# Patient Record
Sex: MALE | Race: BLACK OR AFRICAN AMERICAN
[De-identification: names, ages, dates, MRNs, and addresses within clinical notes are randomized per-mention and may not be internally consistent; named-entity substitution may affect disease eponyms.]

---

## 2017-02-25 ENCOUNTER — HOSPITAL ENCOUNTER (EMERGENCY)
Dept: HOSPITAL 58 - ED | Age: 37
Discharge: HOME | End: 2017-02-25

## 2017-02-25 VITALS — DIASTOLIC BLOOD PRESSURE: 82 MMHG | SYSTOLIC BLOOD PRESSURE: 125 MMHG

## 2017-02-25 VITALS — TEMPERATURE: 99.5 F

## 2017-02-25 VITALS — BODY MASS INDEX: 27.1 KG/M2

## 2017-02-25 DIAGNOSIS — J01.10: Primary | ICD-10-CM

## 2017-02-25 DIAGNOSIS — R42: ICD-10-CM

## 2017-02-25 LAB
ALBUMIN SERPL-MCNC: 4.6 G/DL (ref 3.4–5)
ALBUMIN/GLOB SERPL: 1.24 {RATIO}
ALP SERPL-CCNC: 98 U/L (ref 50–136)
ALT SERPL-CCNC: 17 U/L (ref 12–78)
ANION GAP SERPL CALC-SCNC: 15.1 MMOL/L
AST SERPL-CCNC: 26 U/L (ref 15–37)
BASOPHILS # BLD AUTO: 0 K/UL (ref 0–0.2)
BASOPHILS NFR BLD AUTO: 0.8 % (ref 0–3)
BILIRUB SERPL-MCNC: 0.76 MG/DL (ref 0–1.2)
BUN SERPL-MCNC: 12 MG/DL (ref 7–18)
BUN/CREAT SERPL: 8.39
CALCIUM SERPL-MCNC: 9.9 MG/DL (ref 8.2–10.2)
CHLORIDE SERPL-SCNC: 101 MMOL/L (ref 98–107)
CK MB SERPL-MCNC: 1.8 NG/ML (ref 0–3.6)
CK SERPL-CCNC: 752 U/L
CO2 BLD-SCNC: 27 MMOL/L (ref 21–32)
CREAT SERPL-MCNC: 1.43 MG/DL (ref 0.6–1.1)
EOSINOPHIL # BLD AUTO: 0.3 K/UL (ref 0–0.7)
EOSINOPHIL NFR BLD AUTO: 5.5 % (ref 0–7)
GFR SERPLBLD BASED ON 1.73 SQ M-ARVRAT: 67 ML/MIN
GLOBULIN SER CALC-MCNC: 3.7 G/L
GLUCOSE SERPL-MCNC: 117 MG/DL (ref 70–100)
HCT VFR BLD AUTO: 45.9 % (ref 42–52)
HGB BLD-MCNC: 15.5 G/DL (ref 14–18)
IMM GRANULOCYTES NFR BLD AUTO: 0.4 % (ref 0–5)
LYMPHOCYTES # SPEC AUTO: 0.5 K/UL (ref 0.6–3.4)
LYMPHOCYTES NFR BLD AUTO: 9.6 % (ref 10–50)
MCH RBC QN: 27.2 PG (ref 27–31)
MCHC RBC AUTO-ENTMCNC: 33.8 G/DL (ref 31.8–35.4)
MCV RBC: 80.5 FL (ref 80–94)
MONOCYTES # BLD AUTO: 0.4 K/UL (ref 0.4–2)
MONOCYTES NFR BLD AUTO: 7.2 % (ref 0–10)
NEUTROPHILS # BLD AUTO: 4.1 K/UL (ref 2–6.9)
NEUTROPHILS NFR BLD AUTO: 76.5 %
PLATELET # BLD AUTO: 243 10^3/UL (ref 140–440)
POTASSIUM SERPL-SCNC: 4.1 MMOL/L (ref 3.5–5.1)
PROT SERPL-MCNC: 8.3 G/DL (ref 6.4–8.2)
RBC # BLD AUTO: 5.7 10^6/UL (ref 4.7–6.1)
SODIUM SERPL-SCNC: 139 MMOL/L (ref 136–145)
TROPONIN I SERPL-MCNC: 0.02 NG/ML (ref 0–0.4)
WBC # BLD AUTO: 5.31 K/UL (ref 4.2–10.2)

## 2017-02-25 PROCEDURE — 82553 CREATINE MB FRACTION: CPT

## 2017-02-25 PROCEDURE — 85025 COMPLETE CBC W/AUTO DIFF WBC: CPT

## 2017-02-25 PROCEDURE — 96374 THER/PROPH/DIAG INJ IV PUSH: CPT

## 2017-02-25 PROCEDURE — 96361 HYDRATE IV INFUSION ADD-ON: CPT

## 2017-02-25 PROCEDURE — 84484 ASSAY OF TROPONIN QUANT: CPT

## 2017-02-25 PROCEDURE — 99283 EMERGENCY DEPT VISIT LOW MDM: CPT

## 2017-02-25 PROCEDURE — 93005 ELECTROCARDIOGRAM TRACING: CPT

## 2017-02-25 PROCEDURE — 93010 ELECTROCARDIOGRAM REPORT: CPT

## 2017-02-25 PROCEDURE — 96375 TX/PRO/DX INJ NEW DRUG ADDON: CPT

## 2017-02-25 PROCEDURE — 36415 COLL VENOUS BLD VENIPUNCTURE: CPT

## 2017-02-25 PROCEDURE — 82550 ASSAY OF CK (CPK): CPT

## 2017-02-25 PROCEDURE — 80053 COMPREHEN METABOLIC PANEL: CPT

## 2017-05-03 ENCOUNTER — HOSPITAL ENCOUNTER (EMERGENCY)
Dept: HOSPITAL 58 - ED | Age: 37
Discharge: HOME | End: 2017-05-03

## 2017-05-03 VITALS — BODY MASS INDEX: 27 KG/M2

## 2017-05-03 VITALS — TEMPERATURE: 98 F | SYSTOLIC BLOOD PRESSURE: 154 MMHG | DIASTOLIC BLOOD PRESSURE: 93 MMHG

## 2017-05-03 DIAGNOSIS — R30.0: ICD-10-CM

## 2017-05-03 DIAGNOSIS — R35.0: Primary | ICD-10-CM

## 2017-05-03 LAB
ADD URINE MICROSCOPIC: YES
ALBUMIN SERPL-MCNC: 4.1 G/DL (ref 3.4–5)
ALBUMIN/GLOB SERPL: 1.21 {RATIO}
ALP SERPL-CCNC: 67 U/L (ref 50–136)
ALT SERPL-CCNC: 20 U/L (ref 12–78)
ANION GAP SERPL CALC-SCNC: 12.6 MMOL/L
AST SERPL-CCNC: 21 U/L (ref 15–37)
BACTERIA URNS QL MICRO: (no result)
BASOPHILS # BLD AUTO: 0 K/UL (ref 0–0.2)
BASOPHILS NFR BLD AUTO: 0.6 % (ref 0–3)
BILIRUB SERPL-MCNC: 0.42 MG/DL (ref 0–1.2)
BUN SERPL-MCNC: 13 MG/DL (ref 7–18)
BUN/CREAT SERPL: 11.71
CALCIUM SERPL-MCNC: 9.6 MG/DL (ref 8.2–10.2)
CHLORIDE SERPL-SCNC: 105 MMOL/L (ref 98–107)
CO2 BLD-SCNC: 25 MMOL/L (ref 21–32)
CREAT SERPL-MCNC: 1.11 MG/DL (ref 0.6–1.1)
EOSINOPHIL # BLD AUTO: 0.1 K/UL (ref 0–0.7)
EOSINOPHIL NFR BLD AUTO: 1.7 % (ref 0–7)
GFR SERPLBLD BASED ON 1.73 SQ M-ARVRAT: 90 ML/MIN
GLOBULIN SER CALC-MCNC: 3.4 G/L
GLUCOSE SERPL-MCNC: 122 MG/DL (ref 70–100)
HCT VFR BLD AUTO: 39.8 % (ref 42–52)
HGB BLD-MCNC: 13.7 G/DL (ref 14–18)
IMM GRANULOCYTES NFR BLD AUTO: 0.4 % (ref 0–5)
LYMPHOCYTES # SPEC AUTO: 1.1 K/UL (ref 0.6–3.4)
LYMPHOCYTES NFR BLD AUTO: 21.3 % (ref 10–50)
MCH RBC QN: 27.4 PG (ref 27–31)
MCHC RBC AUTO-ENTMCNC: 34.4 G/DL (ref 31.8–35.4)
MCV RBC: 79.6 FL (ref 80–94)
MONOCYTES # BLD AUTO: 0.5 K/UL (ref 0.4–2)
MONOCYTES NFR BLD AUTO: 8.9 % (ref 0–10)
NEUTROPHILS # BLD AUTO: 3.6 K/UL (ref 2–6.9)
NEUTROPHILS NFR BLD AUTO: 67.1 %
PH UR: 7 [PH] (ref 5–9)
PLATELET # BLD AUTO: 250 10^3/UL (ref 140–440)
POTASSIUM SERPL-SCNC: 3.6 MMOL/L (ref 3.5–5.1)
PROT SERPL-MCNC: 7.5 G/DL (ref 6.4–8.2)
RBC # BLD AUTO: 5 10^6/UL (ref 4.7–6.1)
SODIUM SERPL-SCNC: 139 MMOL/L (ref 136–145)
SP GR UR: 1.02 (ref 1–1.03)
WBC # BLD AUTO: 5.3 K/UL (ref 4.2–10.2)

## 2017-05-03 PROCEDURE — 99283 EMERGENCY DEPT VISIT LOW MDM: CPT

## 2017-05-03 PROCEDURE — 80053 COMPREHEN METABOLIC PANEL: CPT

## 2017-05-03 PROCEDURE — 81001 URINALYSIS AUTO W/SCOPE: CPT

## 2017-05-03 PROCEDURE — 85025 COMPLETE CBC W/AUTO DIFF WBC: CPT

## 2017-05-03 PROCEDURE — 36415 COLL VENOUS BLD VENIPUNCTURE: CPT

## 2017-05-21 ENCOUNTER — HOSPITAL ENCOUNTER (EMERGENCY)
Age: 37
Discharge: HOME | End: 2017-05-21
Payer: MEDICAID

## 2017-05-21 VITALS
OXYGEN SATURATION: 97 % | TEMPERATURE: 98.9 F | HEIGHT: 70 IN | HEART RATE: 82 BPM | SYSTOLIC BLOOD PRESSURE: 162 MMHG | RESPIRATION RATE: 18 BRPM | BODY MASS INDEX: 26.48 KG/M2 | WEIGHT: 185 LBS | DIASTOLIC BLOOD PRESSURE: 98 MMHG

## 2017-05-21 DIAGNOSIS — R39.9 PROSTATE DISORDER WITH LOWER URINARY TRACT SYMPTOMS: Primary | ICD-10-CM

## 2017-05-21 DIAGNOSIS — N42.9 PROSTATE DISORDER WITH LOWER URINARY TRACT SYMPTOMS: Primary | ICD-10-CM

## 2017-05-21 LAB
BASOPHILS ABSOLUTE: 0 K/UL (ref 0–0.2)
BASOPHILS RELATIVE PERCENT: 0.6 % (ref 0–1)
BILIRUBIN URINE: NEGATIVE
BLOOD, URINE: NEGATIVE
CLARITY: CLEAR
COLOR: YELLOW
EOSINOPHILS ABSOLUTE: 0.1 K/UL (ref 0–0.6)
EOSINOPHILS RELATIVE PERCENT: 1.5 % (ref 0–5)
GLUCOSE BLD-MCNC: 84 MG/DL (ref 70–99)
GLUCOSE URINE: NEGATIVE MG/DL
HCT VFR BLD CALC: 43.5 % (ref 42–52)
HEMOGLOBIN: 14.4 G/DL (ref 14–18)
KETONES, URINE: NEGATIVE MG/DL
LEUKOCYTE ESTERASE, URINE: NEGATIVE
LYMPHOCYTES ABSOLUTE: 1.6 K/UL (ref 1.1–4.5)
LYMPHOCYTES RELATIVE PERCENT: 31.2 % (ref 20–40)
MCH RBC QN AUTO: 27.7 PG (ref 27–31)
MCHC RBC AUTO-ENTMCNC: 33.1 G/DL (ref 33–37)
MCV RBC AUTO: 83.7 FL (ref 80–94)
MONOCYTES ABSOLUTE: 0.4 K/UL (ref 0–0.9)
MONOCYTES RELATIVE PERCENT: 8.4 % (ref 0–10)
NEUTROPHILS ABSOLUTE: 3.1 K/UL (ref 1.5–7.5)
NEUTROPHILS RELATIVE PERCENT: 58.1 % (ref 50–65)
NITRITE, URINE: NEGATIVE
PDW BLD-RTO: 13.2 % (ref 11.5–14.5)
PERFORMED ON: NORMAL
PH UA: 6
PLATELET # BLD: 239 K/UL (ref 130–400)
PMV BLD AUTO: 9.3 FL (ref 7.4–10.4)
PROTEIN UA: NEGATIVE MG/DL
RBC # BLD: 5.2 M/UL (ref 4.7–6.1)
SPECIFIC GRAVITY UA: 1.02
UROBILINOGEN, URINE: 0.2 E.U./DL
WBC # BLD: 5.3 K/UL (ref 4.8–10.8)

## 2017-05-21 PROCEDURE — 99282 EMERGENCY DEPT VISIT SF MDM: CPT | Performed by: NURSE PRACTITIONER

## 2017-05-21 PROCEDURE — 87491 CHLMYD TRACH DNA AMP PROBE: CPT

## 2017-05-21 PROCEDURE — 81003 URINALYSIS AUTO W/O SCOPE: CPT

## 2017-05-21 PROCEDURE — 82948 REAGENT STRIP/BLOOD GLUCOSE: CPT

## 2017-05-21 PROCEDURE — 87591 N.GONORRHOEAE DNA AMP PROB: CPT

## 2017-05-21 PROCEDURE — 36415 COLL VENOUS BLD VENIPUNCTURE: CPT

## 2017-05-21 PROCEDURE — 99283 EMERGENCY DEPT VISIT LOW MDM: CPT

## 2017-05-21 PROCEDURE — 85025 COMPLETE CBC W/AUTO DIFF WBC: CPT

## 2017-05-21 RX ORDER — LOSARTAN POTASSIUM 25 MG/1
25 TABLET ORAL DAILY
COMMUNITY

## 2017-05-21 RX ORDER — TAMSULOSIN HYDROCHLORIDE 0.4 MG/1
0.4 CAPSULE ORAL DAILY
Qty: 30 CAPSULE | Refills: 0 | Status: SHIPPED | OUTPATIENT
Start: 2017-05-21

## 2017-05-21 ASSESSMENT — ENCOUNTER SYMPTOMS
EYES NEGATIVE: 1
RESPIRATORY NEGATIVE: 1

## 2017-05-23 LAB
APTIMA MEDIA TYPE: NORMAL
CHLAMYDIA TRACHOMATIS AMPLIFIED DET: NEGATIVE
N GONORRHOEAE AMPLIFIED DET: NEGATIVE
SPECIMEN SOURCE: NORMAL

## 2017-05-24 ENCOUNTER — OFFICE VISIT (OUTPATIENT)
Dept: UROLOGY | Facility: CLINIC | Age: 37
End: 2017-05-24

## 2017-05-24 VITALS
HEIGHT: 69 IN | DIASTOLIC BLOOD PRESSURE: 84 MMHG | TEMPERATURE: 97.2 F | BODY MASS INDEX: 27.4 KG/M2 | SYSTOLIC BLOOD PRESSURE: 112 MMHG | WEIGHT: 185 LBS

## 2017-05-24 DIAGNOSIS — R35.0 FREQUENCY OF URINATION: Primary | ICD-10-CM

## 2017-05-24 DIAGNOSIS — R39.15 URGENCY OF URINATION: ICD-10-CM

## 2017-05-24 DIAGNOSIS — N41.0 PROSTATITIS, ACUTE: ICD-10-CM

## 2017-05-24 LAB
BILIRUB BLD-MCNC: NEGATIVE MG/DL
CLARITY, POC: CLEAR
COLOR UR: YELLOW
GLUCOSE UR STRIP-MCNC: NEGATIVE MG/DL
KETONES UR QL: NEGATIVE
LEUKOCYTE EST, POC: NEGATIVE
NITRITE UR-MCNC: NEGATIVE MG/ML
PH UR: 5.5 [PH] (ref 5–8)
PROT UR STRIP-MCNC: NEGATIVE MG/DL
RBC # UR STRIP: NEGATIVE /UL
SP GR UR: 1.03 (ref 1–1.03)
UROBILINOGEN UR QL: NORMAL

## 2017-05-24 PROCEDURE — 51798 US URINE CAPACITY MEASURE: CPT | Performed by: UROLOGY

## 2017-05-24 PROCEDURE — 87086 URINE CULTURE/COLONY COUNT: CPT | Performed by: UROLOGY

## 2017-05-24 PROCEDURE — 99214 OFFICE O/P EST MOD 30 MIN: CPT | Performed by: UROLOGY

## 2017-05-24 PROCEDURE — 81003 URINALYSIS AUTO W/O SCOPE: CPT | Performed by: UROLOGY

## 2017-05-24 RX ORDER — CIPROFLOXACIN 500 MG/1
500 TABLET, FILM COATED ORAL 2 TIMES DAILY
Qty: 60 TABLET | Refills: 0 | Status: SHIPPED | OUTPATIENT
Start: 2017-05-24 | End: 2017-06-23

## 2017-05-24 RX ORDER — TAMSULOSIN HYDROCHLORIDE 0.4 MG/1
1 CAPSULE ORAL NIGHTLY
COMMUNITY
End: 2017-07-12

## 2017-05-26 LAB — BACTERIA SPEC AEROBE CULT: NORMAL

## 2017-06-06 ENCOUNTER — TELEPHONE (OUTPATIENT)
Dept: UROLOGY | Facility: CLINIC | Age: 37
End: 2017-06-06

## 2017-06-06 NOTE — TELEPHONE ENCOUNTER
Pt called today regarding symptoms he is having that he thinks may be related to Cipro. Last Friday 6/2/17 he began feeling lethargic and nauseous. He does not remember when he began taking the med. He would like to speak with someone about this.

## 2017-06-06 NOTE — TELEPHONE ENCOUNTER
I have spoke with Dr. Brown and he said that the Cipro can be causing the lethargic feeling along with the nausea. I have advised the patient to stop taking it and to see if he starts to feel better by the end of the week. He is going to give me a call on Friday and let me know how he is.

## 2017-06-08 ENCOUNTER — TELEPHONE (OUTPATIENT)
Dept: UROLOGY | Facility: CLINIC | Age: 37
End: 2017-06-08

## 2017-06-08 NOTE — TELEPHONE ENCOUNTER
Ines asked me to call the patient and let him know per Dr Galeas that he doesn't need to take any more antibiotics. I called the patient no answer and then a busy signal. ah

## 2017-06-08 NOTE — TELEPHONE ENCOUNTER
Patient called said he was suppose to call back today to tell you he is feeling better, and that you was suppose to call him in another medication.

## 2017-06-08 NOTE — TELEPHONE ENCOUNTER
I have sent Dr. Galeas a message and asked him what he thinks is best for the patient since cipro did not work for him.

## 2017-07-12 ENCOUNTER — OFFICE VISIT (OUTPATIENT)
Dept: UROLOGY | Facility: CLINIC | Age: 37
End: 2017-07-12

## 2017-07-12 VITALS — HEIGHT: 70 IN | BODY MASS INDEX: 24.94 KG/M2 | TEMPERATURE: 98.4 F | WEIGHT: 174.2 LBS

## 2017-07-12 DIAGNOSIS — R39.15 URGENCY OF URINATION: ICD-10-CM

## 2017-07-12 DIAGNOSIS — R35.0 FREQUENCY OF URINATION: ICD-10-CM

## 2017-07-12 DIAGNOSIS — N41.0 PROSTATITIS, ACUTE: Primary | ICD-10-CM

## 2017-07-12 LAB
BILIRUB BLD-MCNC: NEGATIVE MG/DL
CLARITY, POC: CLEAR
COLOR UR: YELLOW
GLUCOSE UR STRIP-MCNC: NEGATIVE MG/DL
KETONES UR QL: NEGATIVE
LEUKOCYTE EST, POC: NEGATIVE
NITRITE UR-MCNC: NEGATIVE MG/ML
PH UR: 5.5 [PH] (ref 5–8)
PROT UR STRIP-MCNC: NEGATIVE MG/DL
RBC # UR STRIP: NEGATIVE /UL
SP GR UR: 1.02 (ref 1–1.03)
UROBILINOGEN UR QL: NORMAL

## 2017-07-12 PROCEDURE — 99214 OFFICE O/P EST MOD 30 MIN: CPT | Performed by: UROLOGY

## 2017-07-12 PROCEDURE — 81003 URINALYSIS AUTO W/O SCOPE: CPT | Performed by: UROLOGY

## 2017-07-12 RX ORDER — TAMSULOSIN HYDROCHLORIDE 0.4 MG/1
1 CAPSULE ORAL NIGHTLY
Qty: 90 CAPSULE | Refills: 3 | Status: SHIPPED | OUTPATIENT
Start: 2017-07-12 | End: 2018-08-16 | Stop reason: SDUPTHER

## 2017-07-12 NOTE — PROGRESS NOTES
"Subjective    Mr. Acosta is 37 y.o. male    Chief Complaint: LUTS    History of Present Illness     Lower Urinary tract symptoms  Patient is here for her lower urinary tract symptoms. The patient is bothered by slow stream, intermittency, nocturia, urgency, frequency, but is without hematuria. Onset has been sudden. The patient perceives the voided amount is Symptoms have been present for 6 months. Severity is described as Severe. Course has been worsening. The patient perceives the amount voided is normal for the urge. Previous  history includes no significant issues    The following portions of the patient's history were reviewed and updated as appropriate: allergies, current medications, past family history, past medical history, past social history, past surgical history and problem list.    Review of Systems   Constitutional: Negative for chills and fever.   Gastrointestinal: Negative for abdominal pain, anal bleeding and blood in stool.   Genitourinary: Negative for flank pain and hematuria.         Current Outpatient Prescriptions:   •  losartan (COZAAR) 25 MG tablet, Take 25 mg by mouth Daily., Disp: , Rfl:     Past Medical History:   Diagnosis Date   • Hypertension    • Hypogonadism in male    • Kidney stone        Past Surgical History:   Procedure Laterality Date   • BICEPS TENDON REPAIR         Social History     Social History   • Marital status: Single     Spouse name: N/A   • Number of children: N/A   • Years of education: N/A     Social History Main Topics   • Smoking status: Never Smoker   • Smokeless tobacco: None   • Alcohol use No   • Drug use: No   • Sexual activity: Defer     Other Topics Concern   • None     Social History Narrative       Family History   Problem Relation Age of Onset   • No Known Problems Father    • No Known Problems Mother        Objective    Temp 98.4 °F (36.9 °C)  Ht 70\" (177.8 cm)  Wt 174 lb 3.2 oz (79 kg)  BMI 25 kg/m2    Physical Exam   Constitutional: He is " oriented to person, place, and time. He appears well-developed and well-nourished. No distress.   Pulmonary/Chest: Effort normal.   Abdominal: Soft. He exhibits no distension and no mass. There is no tenderness. There is no rebound and no guarding. No hernia.   Neurological: He is alert and oriented to person, place, and time.   Skin: Skin is warm and dry. He is not diaphoretic.   Psychiatric: He has a normal mood and affect.   Vitals reviewed.          Results for orders placed or performed in visit on 07/12/17   POC Urinalysis Dipstick, Automated   Result Value Ref Range    Color Yellow Yellow, Straw, Dark Yellow, Yuliana    Clarity, UA Clear Clear    Glucose, UA Negative Negative, 1000 mg/dL (3+) mg/dL    Bilirubin Negative Negative    Ketones, UA Negative Negative    Specific Gravity  1.025 1.005 - 1.030    Blood, UA Negative Negative    pH, Urine 5.5 5.0 - 8.0    Protein, POC Negative Negative mg/dL    Urobilinogen, UA Normal Normal    Leukocytes Negative Negative    Nitrite, UA Negative Negative     Assessment and Plan    Adarsh was seen today for prostatitis.    Diagnoses and all orders for this visit:    Prostatitis, acute  -     POC Urinalysis Dipstick, Automated    Frequency of urination    Urgency of urination      Patient still with a weakened stream.  I am going to start him on Flomax.  If his symptoms are not improved she will call the office we will schedule a cystoscopy otherwise follow-up in one year.

## 2017-09-21 ENCOUNTER — HOSPITAL ENCOUNTER (EMERGENCY)
Dept: HOSPITAL 58 - ED | Age: 37
LOS: 1 days | Discharge: HOME | End: 2017-09-22

## 2017-09-21 VITALS — TEMPERATURE: 97.5 F

## 2017-09-21 VITALS — BODY MASS INDEX: 26.4 KG/M2

## 2017-09-21 VITALS — DIASTOLIC BLOOD PRESSURE: 101 MMHG | SYSTOLIC BLOOD PRESSURE: 175 MMHG

## 2017-09-21 DIAGNOSIS — K04.7: Primary | ICD-10-CM

## 2017-09-21 DIAGNOSIS — I10: ICD-10-CM

## 2017-09-21 PROCEDURE — 99283 EMERGENCY DEPT VISIT LOW MDM: CPT

## 2017-09-21 PROCEDURE — 96372 THER/PROPH/DIAG INJ SC/IM: CPT

## 2018-04-09 ENCOUNTER — HOSPITAL ENCOUNTER (EMERGENCY)
Dept: HOSPITAL 58 - ED | Age: 38
Discharge: HOME | End: 2018-04-09
Payer: COMMERCIAL

## 2018-04-09 VITALS — BODY MASS INDEX: 25.6 KG/M2

## 2018-04-09 VITALS — DIASTOLIC BLOOD PRESSURE: 101 MMHG | SYSTOLIC BLOOD PRESSURE: 161 MMHG | TEMPERATURE: 100.3 F

## 2018-04-09 DIAGNOSIS — I10: ICD-10-CM

## 2018-04-09 DIAGNOSIS — J01.90: Primary | ICD-10-CM

## 2018-04-09 PROCEDURE — 82550 ASSAY OF CK (CPK): CPT

## 2018-04-09 PROCEDURE — 87086 URINE CULTURE/COLONY COUNT: CPT

## 2018-04-09 PROCEDURE — 36415 COLL VENOUS BLD VENIPUNCTURE: CPT

## 2018-04-09 PROCEDURE — 84439 ASSAY OF FREE THYROXINE: CPT

## 2018-04-09 PROCEDURE — 84443 ASSAY THYROID STIM HORMONE: CPT

## 2018-04-09 PROCEDURE — 93005 ELECTROCARDIOGRAM TRACING: CPT

## 2018-04-09 PROCEDURE — 99283 EMERGENCY DEPT VISIT LOW MDM: CPT

## 2018-04-09 PROCEDURE — 85025 COMPLETE CBC W/AUTO DIFF WBC: CPT

## 2018-04-09 PROCEDURE — 80053 COMPREHEN METABOLIC PANEL: CPT

## 2018-04-09 PROCEDURE — 81001 URINALYSIS AUTO W/SCOPE: CPT

## 2018-04-09 PROCEDURE — 93010 ELECTROCARDIOGRAM REPORT: CPT

## 2018-04-09 PROCEDURE — 80306 DRUG TEST PRSMV INSTRMNT: CPT

## 2018-04-09 PROCEDURE — 82553 CREATINE MB FRACTION: CPT

## 2018-04-09 PROCEDURE — 84484 ASSAY OF TROPONIN QUANT: CPT

## 2018-07-17 ENCOUNTER — HOSPITAL ENCOUNTER (OUTPATIENT)
Dept: HOSPITAL 58 - LAB | Age: 38
Discharge: HOME | End: 2018-07-17
Attending: INTERNAL MEDICINE

## 2018-07-17 VITALS — BODY MASS INDEX: 25.6 KG/M2

## 2018-07-17 DIAGNOSIS — R79.89: ICD-10-CM

## 2018-07-17 DIAGNOSIS — N52.9: ICD-10-CM

## 2018-07-17 DIAGNOSIS — N40.1: Primary | ICD-10-CM

## 2018-07-17 PROCEDURE — 83001 ASSAY OF GONADOTROPIN (FSH): CPT

## 2018-07-17 PROCEDURE — 84402 ASSAY OF FREE TESTOSTERONE: CPT

## 2018-07-17 PROCEDURE — 83002 ASSAY OF GONADOTROPIN (LH): CPT

## 2018-07-17 PROCEDURE — 84403 ASSAY OF TOTAL TESTOSTERONE: CPT

## 2018-07-17 PROCEDURE — 36415 COLL VENOUS BLD VENIPUNCTURE: CPT

## 2018-08-04 ENCOUNTER — HOSPITAL ENCOUNTER (EMERGENCY)
Dept: HOSPITAL 58 - ED | Age: 38
Discharge: HOME | End: 2018-08-04
Payer: COMMERCIAL

## 2018-08-04 VITALS — DIASTOLIC BLOOD PRESSURE: 106 MMHG | SYSTOLIC BLOOD PRESSURE: 169 MMHG

## 2018-08-04 VITALS — TEMPERATURE: 98.3 F

## 2018-08-04 VITALS — BODY MASS INDEX: 25.1 KG/M2

## 2018-08-04 DIAGNOSIS — I10: ICD-10-CM

## 2018-08-04 DIAGNOSIS — S13.9XXA: Primary | ICD-10-CM
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
## 2018-08-04 PROCEDURE — 99283 EMERGENCY DEPT VISIT LOW MDM: CPT

## 2018-08-04 PROCEDURE — 96372 THER/PROPH/DIAG INJ SC/IM: CPT

## 2018-08-16 DIAGNOSIS — R35.0 FREQUENCY OF URINATION: ICD-10-CM

## 2018-08-16 DIAGNOSIS — R39.15 URGENCY OF URINATION: ICD-10-CM

## 2018-08-16 RX ORDER — TAMSULOSIN HYDROCHLORIDE 0.4 MG/1
1 CAPSULE ORAL NIGHTLY
Qty: 14 CAPSULE | Refills: 0 | Status: SHIPPED | OUTPATIENT
Start: 2018-08-16

## 2018-09-11 ENCOUNTER — HOSPITAL ENCOUNTER (EMERGENCY)
Dept: HOSPITAL 58 - ED | Age: 38
Discharge: HOME | End: 2018-09-11

## 2018-09-11 VITALS — TEMPERATURE: 98.7 F | DIASTOLIC BLOOD PRESSURE: 95 MMHG | SYSTOLIC BLOOD PRESSURE: 133 MMHG

## 2018-09-11 VITALS — BODY MASS INDEX: 25.4 KG/M2

## 2018-09-11 DIAGNOSIS — M99.01: ICD-10-CM

## 2018-09-11 DIAGNOSIS — J32.9: Primary | ICD-10-CM

## 2018-09-11 PROCEDURE — 81001 URINALYSIS AUTO W/SCOPE: CPT

## 2018-09-11 PROCEDURE — 85651 RBC SED RATE NONAUTOMATED: CPT

## 2018-09-11 PROCEDURE — 80053 COMPREHEN METABOLIC PANEL: CPT

## 2018-09-11 PROCEDURE — 82553 CREATINE MB FRACTION: CPT

## 2018-09-11 PROCEDURE — 87651 STREP A DNA AMP PROBE: CPT

## 2018-09-11 PROCEDURE — 87502 INFLUENZA DNA AMP PROBE: CPT

## 2018-09-11 PROCEDURE — 93005 ELECTROCARDIOGRAM TRACING: CPT

## 2018-09-11 PROCEDURE — 36415 COLL VENOUS BLD VENIPUNCTURE: CPT

## 2018-09-11 PROCEDURE — 93010 ELECTROCARDIOGRAM REPORT: CPT

## 2018-09-11 PROCEDURE — 82550 ASSAY OF CK (CPK): CPT

## 2018-09-11 PROCEDURE — 87086 URINE CULTURE/COLONY COUNT: CPT

## 2018-09-11 PROCEDURE — 99283 EMERGENCY DEPT VISIT LOW MDM: CPT

## 2018-09-11 PROCEDURE — 80306 DRUG TEST PRSMV INSTRMNT: CPT

## 2018-09-11 PROCEDURE — 85025 COMPLETE CBC W/AUTO DIFF WBC: CPT

## 2019-04-20 ENCOUNTER — HOSPITAL ENCOUNTER (EMERGENCY)
Dept: HOSPITAL 58 - ED | Age: 39
Discharge: HOME | End: 2019-04-20

## 2019-04-20 VITALS — TEMPERATURE: 98.6 F

## 2019-04-20 VITALS — SYSTOLIC BLOOD PRESSURE: 150 MMHG | DIASTOLIC BLOOD PRESSURE: 109 MMHG

## 2019-04-20 VITALS — BODY MASS INDEX: 26.6 KG/M2

## 2019-04-20 DIAGNOSIS — I10: ICD-10-CM

## 2019-04-20 DIAGNOSIS — J32.0: Primary | ICD-10-CM

## 2019-04-20 PROCEDURE — 99283 EMERGENCY DEPT VISIT LOW MDM: CPT

## 2019-04-20 PROCEDURE — 96372 THER/PROPH/DIAG INJ SC/IM: CPT

## 2019-05-05 ENCOUNTER — HOSPITAL ENCOUNTER (EMERGENCY)
Dept: HOSPITAL 58 - ED | Age: 39
LOS: 1 days | Discharge: HOME | End: 2019-05-06

## 2019-05-05 VITALS — BODY MASS INDEX: 26.5 KG/M2

## 2019-05-05 VITALS — SYSTOLIC BLOOD PRESSURE: 146 MMHG | DIASTOLIC BLOOD PRESSURE: 77 MMHG | TEMPERATURE: 100.8 F

## 2019-05-05 DIAGNOSIS — M72.2: Primary | ICD-10-CM

## 2019-05-05 PROCEDURE — 81001 URINALYSIS AUTO W/SCOPE: CPT

## 2019-05-05 PROCEDURE — 87040 BLOOD CULTURE FOR BACTERIA: CPT

## 2019-05-05 PROCEDURE — 36415 COLL VENOUS BLD VENIPUNCTURE: CPT

## 2019-05-05 PROCEDURE — 87502 INFLUENZA DNA AMP PROBE: CPT

## 2019-05-05 PROCEDURE — 84145 PROCALCITONIN (PCT): CPT

## 2019-05-05 PROCEDURE — 96372 THER/PROPH/DIAG INJ SC/IM: CPT

## 2019-05-05 PROCEDURE — 87651 STREP A DNA AMP PROBE: CPT

## 2019-05-05 PROCEDURE — 85651 RBC SED RATE NONAUTOMATED: CPT

## 2019-05-05 PROCEDURE — 83605 ASSAY OF LACTIC ACID: CPT

## 2019-05-05 PROCEDURE — 99283 EMERGENCY DEPT VISIT LOW MDM: CPT

## 2019-05-05 PROCEDURE — 85025 COMPLETE CBC W/AUTO DIFF WBC: CPT

## 2019-05-05 PROCEDURE — 80053 COMPREHEN METABOLIC PANEL: CPT

## 2019-05-17 ENCOUNTER — HOSPITAL ENCOUNTER (EMERGENCY)
Dept: HOSPITAL 58 - ED | Age: 39
Discharge: LEFT BEFORE DISCHARGE | End: 2019-05-17

## 2019-05-17 VITALS — TEMPERATURE: 100.6 F | DIASTOLIC BLOOD PRESSURE: 85 MMHG | SYSTOLIC BLOOD PRESSURE: 128 MMHG

## 2019-05-17 VITALS — BODY MASS INDEX: 25.8 KG/M2

## 2019-05-17 DIAGNOSIS — R53.1: Primary | ICD-10-CM

## 2019-05-17 DIAGNOSIS — M72.2: ICD-10-CM

## 2019-05-17 PROCEDURE — 99284 EMERGENCY DEPT VISIT MOD MDM: CPT
